# Patient Record
(demographics unavailable — no encounter records)

---

## 2025-02-23 NOTE — DISCUSSION/SUMMARY
[FreeTextEntry1] : Steff is a chadd 27 year-old patient presents in office with history of no prior FFS, seeking evaluation of facial features that exacerbate her gender dysphoria. She began her male-to-female transition in 2013  and has been on hormonal therapy consistently since 2016   She has been in therapy and was diagnosed with Gender Dysphoria concerned about certain facial features that appear masculine and cause bullying desires facial feminization surgery followed by Transgender team. The following facial features appear masculine and will need to be modified: -brow -nose  -cheeks -lips -chin/jaw  -tracheal bulge   Allergies:  -NKA   Meds: -biktarvy -estradiol patches, weekly   PMHx: -HIV+   Surgical History  Surgery Type: Breast augmentation  Surgeon:  in [facility]  Year: 2019 No complications    Surgery Type: Rhinoplasty  Surgeon:  in [facility]  Year: 2019 No complications   Denies  botox, filler injections, silicone injections  SH: Denies marijuana use Denies cigarette use  ROS: General health / Constitutional: no fever, no chills, no unusual weight changes, no energy level changes, no night sweats Skin: No color or pigmentation changes, no pruritis, no rashes, no ulcers, Hair: No changes in color, texture, distribution, loss Nails: No color changes, brittleness, infection Head: No headaches, no new jaw pain Eyes: Good visual acuity, no color blindness, no corrective lenses, no photophobia, no diplopia, no blurred vision, no infection, pain, no medications, Ear: no tinnitus, no discharge, no pain, no medications Nose: no epistaxis, no rhinorrhea, no rhinitis, no pain, Mouth & Throat: no gingivitis, no gingival bleeding, no ulcers, no voice changes, no changes in oral mucosa or tongue Neck no stiffness, no pain, no lymphadenitis, no thyroid disorders, Respiratory: no cough, no dyspnea, no wheezing, no chest pain, cyanosis, no pneumonia, no asthma, Cardiovascular: no chest pain, no palpitations, no irregular rhythm, no tachycardia, no bradycardia, no heart failure, no dyspnea on exertion (JONES), no edema Gastrointestinal: no nausea / vomiting, no dysphagia, no reflux / GERD, no abdominal pain, no jaundice Musculoskeletal: no pain in muscles, bones, or joints; no fractures, no dislocations, no muscular weakness, no atrophy Neurological: no paresis, no paralysis, no paresthesia, no seizures, no dizziness, no syncope, no ataxia, no tremor Psychological: no childhood behavioral problems, no irritability, no sleep changes Hematological: no anemia, no bruising, no bleeding tendencies,  PHYSICAL EXAM   General: WDWN, in no distress, A & O x 3 (person, place, time) HEENT Head: AT/NC (atraumatic, normocephalic), including TMJ, sensory and motor; + Prominent brow and lateral orbital rim type III Eyes: EOMI, PERRLA, upper eyelids with hooding, compensation with forehead elevation Ears: exterior, nl hearing, Nose: + slightly wide, bulbous nasal tip with acute nasolabial angle intranasal exam showed enlarged turbinates and deviated caudal septum Throat & mouth: gd palate elevation, nl tongue mobility, nl tonsil size; + slightly prominent mandibular angle with active masseter, Class II malocclusion skeletal retrogenia, boxy wide chin,  Hypoplastic cheeks, +Jowls, cheek ptosis Neck: +excess submental fat; no masses, nl pulses, + prominent tracheal bulge ("Ramiro's Apple")   The patient met with Isauro Razo M.D. in conjunction with Shaylee Rosa N.P. Patient seen and examined by me, Dr. Isauro Razo, personally. Treatment plan reviewed with patient by me. Physician extender was present for assistance only. I attest that the note reflects the visit and our care. All patient questions and concerns were answered and addressed during this appointment time. Met with the patient, reviewed all questions and concerns, took photos, and reviewed pre-surgical packet. Needs CT scan (order sent), PCP clearance and letters from providers.  We had 60 minute meeting with the patient discussing diagnosis and medical management issues and outcomes.   FFS Procedures:  21139: Frontal sinus anterior wall setback 21256: Orbital reconstruction bilateral 68170: Hairline lowering 55089: Browlift bilateral 64302: Supraorbital bar recontouring bilateral 57035: Tarsorrhaphy bilateral 42823: Mandibular angle contouring 14107-45: Mandibular angle osteotomy 49825: Osseous genioplasty narrowing, shortening 49569: Rhinoplasty open 30520: Submucous resection of septum 20912: Cartilage grafting for nasal reconstruction, use of cadaveric cartilage for  grafts, columellar strut, tip graft 88724: Submental fat excision 62459: platysmaplasty 89956 Tracheal Shave 36038: Upper lip augmentation with temporalis fascia 73135: Fat grafting to malar and nasolabial fold regions bilateral from abdomen first 25 ccs   21139 (Frontal sinus setback): Previous exposure to testosterone has led this patient to have a male appearing forehead with a more bossed shaped; this is opposed to a female appearing forehead that is more flat. A frontal sinus setback procedure will reshape the anterior wall of the frontal sinus by pushing back the bone and change the contour from convex (male-shape) to flat (female-shape). This surgical change will create a more flattened feminine brow appearance.   21256 (bilateral orbital reconstruction): The bone growth that occurred during testosterone exposure in the upper half of each orbital has caused this patient to have male appearing orbital features that contribute to the sense of dysphoria she feels in public. Orbital reconstruction with a reciprocating saw for an L-shaped ostectomy, on both sides will help remove the excessive bony protrusion; this will be followed by bone material grafting and resorbable plate fixation. The bilateral orbital reconstruction will help alleviate these orbital and upper facial male features.   35380 + 81601 (Browlift and hairline lowering): Previous exposure to testosterone has led to the patient having a male M-shaped hairline as opposed to a female upside-down U-shaped hairline. Her receded hairline also creates a high, broad male appearing forehead. Her low set eyebrows on top of her orbital roof rim give her a owens, male-appearing look. Both of these male traits: a high hairline and low-set brows are causing her intense feelings of dysphoria. She would benefit from bilateral browlift and hairline lowering procedures. Raising her lateral eyebrow with a bilateral browlift will create a more female appearance. She has stressed a strong desire to wear her own natural hair and does not want to always have to wear a wig to cover up her male features.  21172 (Supraorbital bar contouring): This patient has orbital lateral hooding or overhang of her lateral frontal bone which is typically associated with the male skull and orbits. Supraorbital contouring of this lateral orbital region with a pineapple nicholas will correct this male feature that is causing this patient dysphoria. These procedures also allows us to do a success browlift procedure since the overhang of bone can inhibit the upper movement of the brow and the removal of this allows for an effective lift.   33701 (mandible reduction): This patient has an angular, more boxy jaw which results in male appearing lower face. She also has increased lower facial width related to her mandibular angle lateral projection. Mandibular angle resection and contouring will create a more feminine triangular jaw. By having a mandibular angle reduction through both of these procedures, the lower facial width is narrowed, and this will create a V-shaped, feminine appearance of the jawline when viewed from the front.   45686 (osseous genioplasty): This patient has a wide, large chin that contributes to her feelings of gender dysphoria and being mis-gendered in public. The patient would benefit from an osseous genioplasty that narrows and shortens the chin. The osseous genioplasty will help create a more feminine and more, slender chin.    16960 (Rhinoplasty revision): This patients nose has characteristics of a male nose. The male nose is often larger and wider with a more bulbous nasal tip. These male nasal features make her feel masculine which exacerbates her gender dysphoria. A revision rhinoplasty would be beneficial to feminize her nose by creating a smaller nose and more delicate, softer nasal tip. The lateral dorsal shape will also be feminized by the rhinoplasty.   43380 (Fat grafting to malar/temporal facial regions): This patient had prolonged testosterone exposure resulting in male mid-face features with depressed soft tissues in the cheeks and temporal region. More fullness in the cheek and temporal region may be created with fat grafting from the abdomen or hips to the cheek and temporal region creating a more full, feminine appearance. The Addison fat grafting technique with atraumatic harvest and grafting leads to a 70%+ take of fat grafting to this region and is suggested. This procedure will correct the hypoplastic cheeks and hollowed temporal region.  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 She would benefit from implants lateral to the nose on both sides to establish a haq appearance that softens the face so it will look less harsh. Patient requires a revision facial feminization procedure. This particular procedure was never performed as part of her original FFS procedures. After allowing appropriate time for healing, she has realized that she still has a feeling of dysphoria related to this male appearing region. She desires correction of this male feature with a feminization procedure. The goal of this procedure is to complete her facial transition. It is not anticipated that she will need this procedure revised. It is not anticipated that she will need additional FFS procedures. Ultimately, the sense of dysphoria is based on the patient and her therapist's guidance, we feel that this procedure will help her and will be successful in feminizing this feature and her face.    24946 (Tracheal shave or tracheolaryngoplasty): A prominent Polk Apple is a feature associated with males. Not all trans-women have a prominent Polk Apple. 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 This is seen on lateral head position and when her head is raised to the norah. It causes her intense feelings of dysphoria and it is a cause for misgendering. Therefore, the patient would benefit from a tracheal shave procedure which eliminates this prominent Polk Apple associated with male appearance.   23805 (Upper lip augmentation): Females are shown to have haq lips than males, therefore, an upper lip augmentation will create a more feminine appearance for this patient as she currently has hypoplastic lips. Lip augmentation is also a procedure that not all patients need but we deem that this procedure is necessary for this patient as it well help alleviate her gender dysphoria  Bleeding- It is possible, though unusual, to experience a bleeding episode during or after surgery. Should post-operative bleeding occur, it may require emergency treatment to drain accumulated blood or blood transfusion. Intra-operative blood transfusion may also be required. Do not take any aspirin or anti-inflammatory medications for ten days before or after surgery, as this may increase the risk of bleeding. Non-prescription herbs and dietary supplements can increase the risk of surgical bleeding. Hematoma can occur at any time following injury to the breast. If blood transfusions are necessary to treat blood loss, there is the risk of blood-related infections such as hepatitis and HIV (AIDS). Heparin medications that are used to prevent blood clots in veins can produce bleeding and decreased blood platelets.   Infection- Infection is unusual after surgery. Should an infection occur, additional treatment including antibiotics, hospitalization, or additional surgery may be necessary.   Change Skin Sensation- You may experience a diminished (or loss) of sensitivity of the skin of operative area. Partial or permanent loss of skin sensation can occur after surgery.  Skin Contour Irregularities- Contour and shape irregularities may occur after surgery. Visible and palpable wrinkling may occur. Residual skin irregularities at the ends of the incisions or dog ears are always a possibility when there is excessive redundant skin. This may improve with time, or it can be surgically corrected.   Sutures- Most surgical techniques use deep sutures. You may notice these sutures after your surgery. Sutures may spontaneously poke through the skin, become visible or produce irritation that requires suture removal.   Skin Discoloration / Swelling- Some bruising and swelling normally occurs following surgery. The skin in or near the surgical site can appear either lighter or darker than surrounding skin. Although uncommon, swelling and skin discoloration may persist for long periods of time and, in rare situations, may be permanent.    Skin Sensitivity- Itching, tenderness, or exaggerated responses to hot or cold temperatures may occur after surgery. Usually this resolves during healing, but in rare situations it may be chronic.   Scarring- All surgery leaves scars, some more visible than others. Although good wound healing after a surgical procedure is expected, abnormal scars may occur within the skin and deeper tissues. Scars may be unattractive and of different color than the surrounding skin tone. Scar appearance may also vary within the same scar. Scars may be asymmetrical (appear different on the right and left side of the body). There is the possibility of visible marks in the skin from sutures. In some cases scars may require surgical revision or treatment.   Damage to Deeper Structures- There is the potential for injury to deeper structures including, nerves, blood vessels, muscles during any surgical procedure. The potential for this to occur varies according to the type of procedure being performed. Injury to deeper structures may be temporary or permanent.   Delayed Healing- Wound disruption or delayed wound healing is possible. Some areas of the skin may not heal normally and may take a long time to heal. Areas of skin tissue may die. This may require frequent dressing changes or further surgery to remove the non-healed tissue. Individuals who have decreased blood supply to tissue from past surgery or radiation therapy may be at increased risk for wound healing and poor surgical outcome. Smokers have a greater risk of skin loss and wound healing complications.   Fat Necrosis- Fatty tissue found deep in the skin might die. This may produce areas of firmness within the skin. Additional surgery to remove areas of fat necrosis may be necessary. There is the possibility of contour irregularities in the skin that may result from fat necrosis.   Allergic Reactions- In rare cases, local allergies to tape, suture material and glues, blood products, topical preparations or injected agents have been reported. Serious systemic reactions including shock (anaphylaxis) may occur in response to drugs used during surgery and prescription medicines. Allergic reactions may require additional treatment.     Cardiac and Pulmonary Complications- Pulmonary complications may occur secondarily to both blood clots (pulmonary emboli), fat deposits (fat emboli) or partial collapse of the lungs after general anesthesia. Pulmonary emboli can be life-threatening or fatal in some circumstances. Inactivity and other conditions may increase the incidence of blood clots traveling to the lungs causing a major blood clot that may result in death. It is important to discuss with your physician any past history of swelling in your legs or blood clots that may contribute to this condition. Cardiac complications are a risk with any surgery and anesthesia, even in patients without symptoms. If you experience shortness of breath, chest pain, or unusual heart beats, seek medical attention immediately. Should any of these complications occur, you may require hospitalization and additional treatment.    Surgical Anesthesia- Both local and general anesthesia involve risk. There is the possibility of complications, injury, and even death from all forms of surgical anesthesia or sedation.  Seroma- Infrequently, fluid may accumulate between the skin and the underlying tissues following surgery, trauma or vigorous exercise. Should this problem occur, it may require additional procedures for drainage of fluid.    Shock- In rare circumstances, your surgical procedure can cause severe trauma, particularly when multiple or extensive procedures are performed. Although serious complications are infrequent, infections or excessive fluid loss can lead to severe illness and even death. If surgical shock occurs, hospitalization and additional treatment would be necessary.   Pain- You will experience pain after your surgery. Pain of varying intensity and duration may occur and persist after surgery. Chronic pain may occur very infrequently from nerves becoming trapped in scar tissue or due to tissue stretching.      All patient questions and concerns addressed during today's consultation.   The patient will need a CT scan for virtual planning (radiology order sent), 2 letters of support needed (from therapist and hormone provider), and PCP clearance prior to surgery. [employer]. [place] Speaking patient

## 2025-02-23 NOTE — DISCUSSION/SUMMARY
[FreeTextEntry1] : Steff is a chadd 27 year-old patient presents in office with history of no prior FFS, seeking evaluation of facial features that exacerbate her gender dysphoria. She began her male-to-female transition in 2013  and has been on hormonal therapy consistently since 2016   She has been in therapy and was diagnosed with Gender Dysphoria concerned about certain facial features that appear masculine and cause bullying desires facial feminization surgery followed by Transgender team. The following facial features appear masculine and will need to be modified: -brow -nose  -cheeks -lips -chin/jaw  -tracheal bulge   Allergies:  -NKA   Meds: -biktarvy -estradiol patches, weekly   PMHx: -HIV+   Surgical History  Surgery Type: Breast augmentation  Surgeon:  in [facility]  Year: 2019 No complications    Surgery Type: Rhinoplasty  Surgeon:  in [facility]  Year: 2019 No complications   Denies  botox, filler injections, silicone injections  SH: Denies marijuana use Denies cigarette use  ROS: General health / Constitutional: no fever, no chills, no unusual weight changes, no energy level changes, no night sweats Skin: No color or pigmentation changes, no pruritis, no rashes, no ulcers, Hair: No changes in color, texture, distribution, loss Nails: No color changes, brittleness, infection Head: No headaches, no new jaw pain Eyes: Good visual acuity, no color blindness, no corrective lenses, no photophobia, no diplopia, no blurred vision, no infection, pain, no medications, Ear: no tinnitus, no discharge, no pain, no medications Nose: no epistaxis, no rhinorrhea, no rhinitis, no pain, Mouth & Throat: no gingivitis, no gingival bleeding, no ulcers, no voice changes, no changes in oral mucosa or tongue Neck no stiffness, no pain, no lymphadenitis, no thyroid disorders, Respiratory: no cough, no dyspnea, no wheezing, no chest pain, cyanosis, no pneumonia, no asthma, Cardiovascular: no chest pain, no palpitations, no irregular rhythm, no tachycardia, no bradycardia, no heart failure, no dyspnea on exertion (JONES), no edema Gastrointestinal: no nausea / vomiting, no dysphagia, no reflux / GERD, no abdominal pain, no jaundice Musculoskeletal: no pain in muscles, bones, or joints; no fractures, no dislocations, no muscular weakness, no atrophy Neurological: no paresis, no paralysis, no paresthesia, no seizures, no dizziness, no syncope, no ataxia, no tremor Psychological: no childhood behavioral problems, no irritability, no sleep changes Hematological: no anemia, no bruising, no bleeding tendencies,  PHYSICAL EXAM   General: WDWN, in no distress, A & O x 3 (person, place, time) HEENT Head: AT/NC (atraumatic, normocephalic), including TMJ, sensory and motor; + Prominent brow and lateral orbital rim type III Eyes: EOMI, PERRLA, upper eyelids with hooding, compensation with forehead elevation Ears: exterior, nl hearing, Nose: + slightly wide, bulbous nasal tip with acute nasolabial angle intranasal exam showed enlarged turbinates and deviated caudal septum Throat & mouth: gd palate elevation, nl tongue mobility, nl tonsil size; + slightly prominent mandibular angle with active masseter, Class II malocclusion skeletal retrogenia, boxy wide chin,  Hypoplastic cheeks, +Jowls, cheek ptosis Neck: +excess submental fat; no masses, nl pulses, + prominent tracheal bulge ("Ramiro's Apple")   The patient met with Isauro Razo M.D. in conjunction with Shaylee Rosa N.P. Patient seen and examined by me, Dr. Isauro Razo, personally. Treatment plan reviewed with patient by me. Physician extender was present for assistance only. I attest that the note reflects the visit and our care. All patient questions and concerns were answered and addressed during this appointment time. Met with the patient, reviewed all questions and concerns, took photos, and reviewed pre-surgical packet. Needs CT scan (order sent), PCP clearance and letters from providers.  We had 60 minute meeting with the patient discussing diagnosis and medical management issues and outcomes.   FFS Procedures:  21139: Frontal sinus anterior wall setback 21256: Orbital reconstruction bilateral 94494: Hairline lowering 78857: Browlift bilateral 12758: Supraorbital bar recontouring bilateral 79499: Tarsorrhaphy bilateral 82506: Mandibular angle contouring 03050-20: Mandibular angle osteotomy 51149: Osseous genioplasty narrowing, shortening 30895: Rhinoplasty open 30520: Submucous resection of septum 20912: Cartilage grafting for nasal reconstruction, use of cadaveric cartilage for  grafts, columellar strut, tip graft 00495: Submental fat excision 10726: platysmaplasty 24422 Tracheal Shave 80961: Upper lip augmentation with temporalis fascia 10692: Fat grafting to malar and nasolabial fold regions bilateral from abdomen first 25 ccs   21139 (Frontal sinus setback): Previous exposure to testosterone has led this patient to have a male appearing forehead with a more bossed shaped; this is opposed to a female appearing forehead that is more flat. A frontal sinus setback procedure will reshape the anterior wall of the frontal sinus by pushing back the bone and change the contour from convex (male-shape) to flat (female-shape). This surgical change will create a more flattened feminine brow appearance.   21256 (bilateral orbital reconstruction): The bone growth that occurred during testosterone exposure in the upper half of each orbital has caused this patient to have male appearing orbital features that contribute to the sense of dysphoria she feels in public. Orbital reconstruction with a reciprocating saw for an L-shaped ostectomy, on both sides will help remove the excessive bony protrusion; this will be followed by bone material grafting and resorbable plate fixation. The bilateral orbital reconstruction will help alleviate these orbital and upper facial male features.   32002 + 28207 (Browlift and hairline lowering): Previous exposure to testosterone has led to the patient having a male M-shaped hairline as opposed to a female upside-down U-shaped hairline. Her receded hairline also creates a high, broad male appearing forehead. Her low set eyebrows on top of her orbital roof rim give her a owens, male-appearing look. Both of these male traits: a high hairline and low-set brows are causing her intense feelings of dysphoria. She would benefit from bilateral browlift and hairline lowering procedures. Raising her lateral eyebrow with a bilateral browlift will create a more female appearance. She has stressed a strong desire to wear her own natural hair and does not want to always have to wear a wig to cover up her male features.  21172 (Supraorbital bar contouring): This patient has orbital lateral hooding or overhang of her lateral frontal bone which is typically associated with the male skull and orbits. Supraorbital contouring of this lateral orbital region with a pineapple nicholas will correct this male feature that is causing this patient dysphoria. These procedures also allows us to do a success browlift procedure since the overhang of bone can inhibit the upper movement of the brow and the removal of this allows for an effective lift.   53195 (mandible reduction): This patient has an angular, more boxy jaw which results in male appearing lower face. She also has increased lower facial width related to her mandibular angle lateral projection. Mandibular angle resection and contouring will create a more feminine triangular jaw. By having a mandibular angle reduction through both of these procedures, the lower facial width is narrowed, and this will create a V-shaped, feminine appearance of the jawline when viewed from the front.   19129 (osseous genioplasty): This patient has a wide, large chin that contributes to her feelings of gender dysphoria and being mis-gendered in public. The patient would benefit from an osseous genioplasty that narrows and shortens the chin. The osseous genioplasty will help create a more feminine and more, slender chin.    13805 (Rhinoplasty revision): This patients nose has characteristics of a male nose. The male nose is often larger and wider with a more bulbous nasal tip. These male nasal features make her feel masculine which exacerbates her gender dysphoria. A revision rhinoplasty would be beneficial to feminize her nose by creating a smaller nose and more delicate, softer nasal tip. The lateral dorsal shape will also be feminized by the rhinoplasty.   94213 (Fat grafting to malar/temporal facial regions): This patient had prolonged testosterone exposure resulting in male mid-face features with depressed soft tissues in the cheeks and temporal region. More fullness in the cheek and temporal region may be created with fat grafting from the abdomen or hips to the cheek and temporal region creating a more full, feminine appearance. The Addison fat grafting technique with atraumatic harvest and grafting leads to a 70%+ take of fat grafting to this region and is suggested. This procedure will correct the hypoplastic cheeks and hollowed temporal region.  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 She would benefit from implants lateral to the nose on both sides to establish a haq appearance that softens the face so it will look less harsh. Patient requires a revision facial feminization procedure. This particular procedure was never performed as part of her original FFS procedures. After allowing appropriate time for healing, she has realized that she still has a feeling of dysphoria related to this male appearing region. She desires correction of this male feature with a feminization procedure. The goal of this procedure is to complete her facial transition. It is not anticipated that she will need this procedure revised. It is not anticipated that she will need additional FFS procedures. Ultimately, the sense of dysphoria is based on the patient and her therapist's guidance, we feel that this procedure will help her and will be successful in feminizing this feature and her face.    28996 (Tracheal shave or tracheolaryngoplasty): A prominent Polk Apple is a feature associated with males. Not all trans-women have a prominent Polk Apple. 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 This is seen on lateral head position and when her head is raised to the norah. It causes her intense feelings of dysphoria and it is a cause for misgendering. Therefore, the patient would benefit from a tracheal shave procedure which eliminates this prominent Polk Apple associated with male appearance.   68988 (Upper lip augmentation): Females are shown to have haq lips than males, therefore, an upper lip augmentation will create a more feminine appearance for this patient as she currently has hypoplastic lips. Lip augmentation is also a procedure that not all patients need but we deem that this procedure is necessary for this patient as it well help alleviate her gender dysphoria  Bleeding- It is possible, though unusual, to experience a bleeding episode during or after surgery. Should post-operative bleeding occur, it may require emergency treatment to drain accumulated blood or blood transfusion. Intra-operative blood transfusion may also be required. Do not take any aspirin or anti-inflammatory medications for ten days before or after surgery, as this may increase the risk of bleeding. Non-prescription herbs and dietary supplements can increase the risk of surgical bleeding. Hematoma can occur at any time following injury to the breast. If blood transfusions are necessary to treat blood loss, there is the risk of blood-related infections such as hepatitis and HIV (AIDS). Heparin medications that are used to prevent blood clots in veins can produce bleeding and decreased blood platelets.   Infection- Infection is unusual after surgery. Should an infection occur, additional treatment including antibiotics, hospitalization, or additional surgery may be necessary.   Change Skin Sensation- You may experience a diminished (or loss) of sensitivity of the skin of operative area. Partial or permanent loss of skin sensation can occur after surgery.  Skin Contour Irregularities- Contour and shape irregularities may occur after surgery. Visible and palpable wrinkling may occur. Residual skin irregularities at the ends of the incisions or dog ears are always a possibility when there is excessive redundant skin. This may improve with time, or it can be surgically corrected.   Sutures- Most surgical techniques use deep sutures. You may notice these sutures after your surgery. Sutures may spontaneously poke through the skin, become visible or produce irritation that requires suture removal.   Skin Discoloration / Swelling- Some bruising and swelling normally occurs following surgery. The skin in or near the surgical site can appear either lighter or darker than surrounding skin. Although uncommon, swelling and skin discoloration may persist for long periods of time and, in rare situations, may be permanent.    Skin Sensitivity- Itching, tenderness, or exaggerated responses to hot or cold temperatures may occur after surgery. Usually this resolves during healing, but in rare situations it may be chronic.   Scarring- All surgery leaves scars, some more visible than others. Although good wound healing after a surgical procedure is expected, abnormal scars may occur within the skin and deeper tissues. Scars may be unattractive and of different color than the surrounding skin tone. Scar appearance may also vary within the same scar. Scars may be asymmetrical (appear different on the right and left side of the body). There is the possibility of visible marks in the skin from sutures. In some cases scars may require surgical revision or treatment.   Damage to Deeper Structures- There is the potential for injury to deeper structures including, nerves, blood vessels, muscles during any surgical procedure. The potential for this to occur varies according to the type of procedure being performed. Injury to deeper structures may be temporary or permanent.   Delayed Healing- Wound disruption or delayed wound healing is possible. Some areas of the skin may not heal normally and may take a long time to heal. Areas of skin tissue may die. This may require frequent dressing changes or further surgery to remove the non-healed tissue. Individuals who have decreased blood supply to tissue from past surgery or radiation therapy may be at increased risk for wound healing and poor surgical outcome. Smokers have a greater risk of skin loss and wound healing complications.   Fat Necrosis- Fatty tissue found deep in the skin might die. This may produce areas of firmness within the skin. Additional surgery to remove areas of fat necrosis may be necessary. There is the possibility of contour irregularities in the skin that may result from fat necrosis.   Allergic Reactions- In rare cases, local allergies to tape, suture material and glues, blood products, topical preparations or injected agents have been reported. Serious systemic reactions including shock (anaphylaxis) may occur in response to drugs used during surgery and prescription medicines. Allergic reactions may require additional treatment.     Cardiac and Pulmonary Complications- Pulmonary complications may occur secondarily to both blood clots (pulmonary emboli), fat deposits (fat emboli) or partial collapse of the lungs after general anesthesia. Pulmonary emboli can be life-threatening or fatal in some circumstances. Inactivity and other conditions may increase the incidence of blood clots traveling to the lungs causing a major blood clot that may result in death. It is important to discuss with your physician any past history of swelling in your legs or blood clots that may contribute to this condition. Cardiac complications are a risk with any surgery and anesthesia, even in patients without symptoms. If you experience shortness of breath, chest pain, or unusual heart beats, seek medical attention immediately. Should any of these complications occur, you may require hospitalization and additional treatment.    Surgical Anesthesia- Both local and general anesthesia involve risk. There is the possibility of complications, injury, and even death from all forms of surgical anesthesia or sedation.  Seroma- Infrequently, fluid may accumulate between the skin and the underlying tissues following surgery, trauma or vigorous exercise. Should this problem occur, it may require additional procedures for drainage of fluid.    Shock- In rare circumstances, your surgical procedure can cause severe trauma, particularly when multiple or extensive procedures are performed. Although serious complications are infrequent, infections or excessive fluid loss can lead to severe illness and even death. If surgical shock occurs, hospitalization and additional treatment would be necessary.   Pain- You will experience pain after your surgery. Pain of varying intensity and duration may occur and persist after surgery. Chronic pain may occur very infrequently from nerves becoming trapped in scar tissue or due to tissue stretching.      All patient questions and concerns addressed during today's consultation.   The patient will need a CT scan for virtual planning (radiology order sent), 2 letters of support needed (from therapist and hormone provider), and PCP clearance prior to surgery. [employer]. [place] Speaking patient

## 2025-07-26 NOTE — DISCUSSION/SUMMARY
[FreeTextEntry1] : Staci is a 28 patient who is known to the practice.  She has previously seen Dr Isauro Razo for consultation and pre-surgical planning for FFS, with a current scheduled date of 8/20/25. We are proceeding with surgery scheduled for this date.   The patient's medications, allergies, and PMH were updated to reflect accuracy. Patient underwent 3D morphing, discussed pre-surgical/perioperative information, gave patient print out. All of patient's questions and concerns were answered during this appointment. The patient met with Isauro Razo M.D. in conjunction with Shaylee Rosa N.P. All patient questions and concerns were answered and addressed during this appointment time.   The patient has surgery scheduled for FFS procedures which include:  Reviewed patient PMH for accuracy.   Allergies: -NKA  Meds: -biktarvy -estradiol patches, weekly  PMHx: -HIV+  Surgical History Surgery Type: Breast augmentation Surgeon:  in [facility] Year: 2019 No complications  Surgery Type: Rhinoplasty Surgeon:  in [facility] Year: 2019 No complications  Denies botox, filler injections, silicone injections  SH: Denies marijuana use Denies cigarette use   - Prepare soft foods (applesauce, mash potatoes, smoothies, soups, oatmeal) for postop soft diet. - Your at-home post op medications will be available for  at the hospital's pharmacy when you are discharged. - Do not eat or drink after 12am the night before surgery. - The hospital will call you the day before surgery to let you know what time your surgery starts and what time to arrive to the hospital. - Contact us with any questions or concerns.   We had a 35 minute meeting with the patient discussing diagnosis and medical management issues and outcomes. CT scan received, letters received, patient is completing her PST with her primary care doctor.   We discussed the instructions and the patient confirmed an understanding of them. We reviewed these procedures and their risks   Bleeding- It is possible, though unusual, to experience a bleeding episode during or after surgery. Should post-operative bleeding occur, it may require emergency treatment to drain accumulated blood or blood transfusion. Intra-operative blood transfusion may also be required. Do not take any aspirin or anti-inflammatory medications for ten days before or after surgery, as this may increase the risk of bleeding. Non-prescription herbs and dietary supplements can increase the risk of surgical bleeding. Hematoma can occur at any time following injury to the breast. If blood transfusions are necessary to treat blood loss, there is the risk of blood-related infections such as hepatitis and HIV (AIDS). Heparin medications that are used to prevent blood clots in veins can produce bleeding and decreased blood platelets.   Infection- Infection is unusual after surgery. Should an infection occur, additional treatment including antibiotics, hospitalization, or additional surgery may be necessary.   Change Skin Sensation- You may experience a diminished (or loss) of sensitivity of the skin of operative area. Partial or permanent loss of skin sensation can occur after surgery. Skin Contour Irregularities- Contour and shape irregularities may occur after surgery. Visible and palpable wrinkling may occur. Residual skin irregularities at the ends of the incisions or dog ears are always a possibility when there is excessive redundant skin. This may improve with time, or it can be surgically corrected.   Sutures- Most surgical techniques use deep sutures. You may notice these sutures after your surgery. Sutures may spontaneously poke through the skin, become visible or produce irritation that requires suture removal.   Skin Discoloration / Swelling- Some bruising and swelling normally occurs following surgery. The skin in or near the surgical site can appear either lighter or darker than surrounding skin. Although uncommon, swelling and skin discoloration may persist for long periods of time and, in rare situations, may be permanent.   Skin Sensitivity- Itching, tenderness, or exaggerated responses to hot or cold temperatures may occur after surgery. Usually this resolves during healing, but in rare situations it may be chronic.   Scarring- All surgery leaves scars, some more visible than others. Although good wound healing after a surgical procedure is expected, abnormal scars may occur within the skin and deeper tissues. Scars may be unattractive and of different color than the surrounding skin tone. Scar appearance may also vary within the same scar. Scars may be asymmetrical (appear different on the right and left side of the body). There is the possibility of visible marks in the skin from sutures. In some cases scars may require surgical revision or treatment.   Damage to Deeper Structures- There is the potential for injury to deeper structures including, nerves, blood vessels, muscles during any surgical procedure. The potential for this to occur varies according to the type of procedure being performed. Injury to deeper structures may be temporary or permanent.   Delayed Healing- Wound disruption or delayed wound healing is possible. Some areas of the skin may not heal normally and may take a long time to heal. Areas of skin tissue may die. This may require frequent dressing changes or further surgery to remove the non-healed tissue. Individuals who have decreased blood supply to tissue from past surgery or radiation therapy may be at increased risk for wound healing and poor surgical outcome. Smokers have a greater risk of skin loss and wound healing complications.   Fat Necrosis- Fatty tissue found deep in the skin might die. This may produce areas of firmness within the skin. Additional surgery to remove areas of fat necrosis may be necessary. There is the possibility of contour irregularities in the skin that may result from fat necrosis.   Allergic Reactions- In rare cases, local allergies to tape, suture material and glues, blood products, topical preparations or injected agents have been reported. Serious systemic reactions including shock (anaphylaxis) may occur in response to drugs used during surgery and prescription medicines. Allergic reactions may require additional treatment.  Patient seen and examined by me, Dr. Isauro Razo, personally. Treatment plan reviewed with patient by me. Physician extender was present for assistance only. I attest that the note reflects the visit and our care.  Spend 45 minutes with patient and family discussing the diagnosis and treatment plan.  Patient and family informed of the timing and risks moving forward. All patient questions were answered.

## 2025-07-26 NOTE — DISCUSSION/SUMMARY
[FreeTextEntry1] : Staci is a 28 patient who is known to the practice.  She has previously seen Dr Isauro Razo for consultation and pre-surgical planning for FFS, with a current scheduled date of 8/20/25. We are proceeding with surgery scheduled for this date.   The patient's medications, allergies, and PMH were updated to reflect accuracy. Patient underwent 3D morphing, discussed pre-surgical/perioperative information, gave patient print out. All of patient's questions and concerns were answered during this appointment. The patient met with Isauro Razo M.D. in conjunction with Shaylee Rosa N.P. All patient questions and concerns were answered and addressed during this appointment time.   The patient has surgery scheduled for FFS procedures which include:  Reviewed patient PMH for accuracy.   Allergies: -NKA  Meds: -biktarvy -estradiol patches, weekly  PMHx: -HIV+  Surgical History Surgery Type: Breast augmentation Surgeon:  in [facility] Year: 2019 No complications  Surgery Type: Rhinoplasty Surgeon:  in [facility] Year: 2019 No complications  Denies botox, filler injections, silicone injections  SH: Denies marijuana use Denies cigarette use   - Prepare soft foods (applesauce, mash potatoes, smoothies, soups, oatmeal) for postop soft diet. - Your at-home post op medications will be available for  at the hospital's pharmacy when you are discharged. - Do not eat or drink after 12am the night before surgery. - The hospital will call you the day before surgery to let you know what time your surgery starts and what time to arrive to the hospital. - Contact us with any questions or concerns.   We had a 35 minute meeting with the patient discussing diagnosis and medical management issues and outcomes. CT scan received, letters received, patient is completing her PST with her primary care doctor.   We discussed the instructions and the patient confirmed an understanding of them. We reviewed these procedures and their risks   Bleeding- It is possible, though unusual, to experience a bleeding episode during or after surgery. Should post-operative bleeding occur, it may require emergency treatment to drain accumulated blood or blood transfusion. Intra-operative blood transfusion may also be required. Do not take any aspirin or anti-inflammatory medications for ten days before or after surgery, as this may increase the risk of bleeding. Non-prescription herbs and dietary supplements can increase the risk of surgical bleeding. Hematoma can occur at any time following injury to the breast. If blood transfusions are necessary to treat blood loss, there is the risk of blood-related infections such as hepatitis and HIV (AIDS). Heparin medications that are used to prevent blood clots in veins can produce bleeding and decreased blood platelets.   Infection- Infection is unusual after surgery. Should an infection occur, additional treatment including antibiotics, hospitalization, or additional surgery may be necessary.   Change Skin Sensation- You may experience a diminished (or loss) of sensitivity of the skin of operative area. Partial or permanent loss of skin sensation can occur after surgery. Skin Contour Irregularities- Contour and shape irregularities may occur after surgery. Visible and palpable wrinkling may occur. Residual skin irregularities at the ends of the incisions or dog ears are always a possibility when there is excessive redundant skin. This may improve with time, or it can be surgically corrected.   Sutures- Most surgical techniques use deep sutures. You may notice these sutures after your surgery. Sutures may spontaneously poke through the skin, become visible or produce irritation that requires suture removal.   Skin Discoloration / Swelling- Some bruising and swelling normally occurs following surgery. The skin in or near the surgical site can appear either lighter or darker than surrounding skin. Although uncommon, swelling and skin discoloration may persist for long periods of time and, in rare situations, may be permanent.   Skin Sensitivity- Itching, tenderness, or exaggerated responses to hot or cold temperatures may occur after surgery. Usually this resolves during healing, but in rare situations it may be chronic.   Scarring- All surgery leaves scars, some more visible than others. Although good wound healing after a surgical procedure is expected, abnormal scars may occur within the skin and deeper tissues. Scars may be unattractive and of different color than the surrounding skin tone. Scar appearance may also vary within the same scar. Scars may be asymmetrical (appear different on the right and left side of the body). There is the possibility of visible marks in the skin from sutures. In some cases scars may require surgical revision or treatment.   Damage to Deeper Structures- There is the potential for injury to deeper structures including, nerves, blood vessels, muscles during any surgical procedure. The potential for this to occur varies according to the type of procedure being performed. Injury to deeper structures may be temporary or permanent.   Delayed Healing- Wound disruption or delayed wound healing is possible. Some areas of the skin may not heal normally and may take a long time to heal. Areas of skin tissue may die. This may require frequent dressing changes or further surgery to remove the non-healed tissue. Individuals who have decreased blood supply to tissue from past surgery or radiation therapy may be at increased risk for wound healing and poor surgical outcome. Smokers have a greater risk of skin loss and wound healing complications.   Fat Necrosis- Fatty tissue found deep in the skin might die. This may produce areas of firmness within the skin. Additional surgery to remove areas of fat necrosis may be necessary. There is the possibility of contour irregularities in the skin that may result from fat necrosis.   Allergic Reactions- In rare cases, local allergies to tape, suture material and glues, blood products, topical preparations or injected agents have been reported. Serious systemic reactions including shock (anaphylaxis) may occur in response to drugs used during surgery and prescription medicines. Allergic reactions may require additional treatment.  Patient seen and examined by me, Dr. Isauro Razo, personally. Treatment plan reviewed with patient by me. Physician extender was present for assistance only. I attest that the note reflects the visit and our care.  Spend 45 minutes with patient and family discussing the diagnosis and treatment plan.  Patient and family informed of the timing and risks moving forward. All patient questions were answered.